# Patient Record
Sex: FEMALE | Race: BLACK OR AFRICAN AMERICAN | NOT HISPANIC OR LATINO | URBAN - METROPOLITAN AREA
[De-identification: names, ages, dates, MRNs, and addresses within clinical notes are randomized per-mention and may not be internally consistent; named-entity substitution may affect disease eponyms.]

---

## 2017-01-01 ENCOUNTER — AMBULATORY - HEALTHEAST (OUTPATIENT)
Dept: BEHAVIORAL HEALTH | Facility: CLINIC | Age: 52
End: 2017-01-01

## 2017-01-08 ENCOUNTER — COMMUNICATION - HEALTHEAST (OUTPATIENT)
Dept: BEHAVIORAL HEALTH | Facility: CLINIC | Age: 52
End: 2017-01-08

## 2017-02-02 ENCOUNTER — OFFICE VISIT - HEALTHEAST (OUTPATIENT)
Dept: ADDICTION MEDICINE | Facility: CLINIC | Age: 52
End: 2017-02-02

## 2017-02-02 DIAGNOSIS — F11.20 OPIOID USE DISORDER, SEVERE, DEPENDENCE (H): ICD-10-CM

## 2017-02-13 ENCOUNTER — COMMUNICATION - HEALTHEAST (OUTPATIENT)
Dept: BEHAVIORAL HEALTH | Facility: CLINIC | Age: 52
End: 2017-02-13

## 2017-03-03 ENCOUNTER — COMMUNICATION - HEALTHEAST (OUTPATIENT)
Dept: BEHAVIORAL HEALTH | Facility: CLINIC | Age: 52
End: 2017-03-03

## 2017-03-07 ENCOUNTER — COMMUNICATION - HEALTHEAST (OUTPATIENT)
Dept: BEHAVIORAL HEALTH | Facility: CLINIC | Age: 52
End: 2017-03-07

## 2017-03-07 DIAGNOSIS — E11.42 TYPE 2 DIABETES MELLITUS WITH DIABETIC POLYNEUROPATHY, WITH LONG-TERM CURRENT USE OF INSULIN (H): ICD-10-CM

## 2017-03-07 DIAGNOSIS — Z79.4 TYPE 2 DIABETES MELLITUS WITH DIABETIC POLYNEUROPATHY, WITH LONG-TERM CURRENT USE OF INSULIN (H): ICD-10-CM

## 2017-05-10 ENCOUNTER — AMBULATORY - HEALTHEAST (OUTPATIENT)
Dept: ADDICTION MEDICINE | Facility: CLINIC | Age: 52
End: 2017-05-10

## 2018-12-18 ENCOUNTER — OFFICE VISIT (OUTPATIENT)
Dept: FAMILY MEDICINE | Facility: CLINIC | Age: 53
End: 2018-12-18
Payer: MEDICAID

## 2018-12-18 ENCOUNTER — ANCILLARY PROCEDURE (OUTPATIENT)
Dept: GENERAL RADIOLOGY | Facility: CLINIC | Age: 53
End: 2018-12-18
Attending: NURSE PRACTITIONER
Payer: MEDICAID

## 2018-12-18 VITALS
DIASTOLIC BLOOD PRESSURE: 74 MMHG | HEART RATE: 60 BPM | BODY MASS INDEX: 28.44 KG/M2 | OXYGEN SATURATION: 100 % | SYSTOLIC BLOOD PRESSURE: 128 MMHG | WEIGHT: 192 LBS | TEMPERATURE: 98.2 F | RESPIRATION RATE: 28 BRPM | HEIGHT: 69 IN

## 2018-12-18 DIAGNOSIS — F19.20 DRUG ADDICTION (H): ICD-10-CM

## 2018-12-18 DIAGNOSIS — M25.561 RIGHT KNEE PAIN, UNSPECIFIED CHRONICITY: Primary | ICD-10-CM

## 2018-12-18 DIAGNOSIS — F14.90 CRACK COCAINE USE: ICD-10-CM

## 2018-12-18 DIAGNOSIS — M25.561 RIGHT KNEE PAIN, UNSPECIFIED CHRONICITY: ICD-10-CM

## 2018-12-18 DIAGNOSIS — Z12.31 VISIT FOR SCREENING MAMMOGRAM: ICD-10-CM

## 2018-12-18 PROCEDURE — 99214 OFFICE O/P EST MOD 30 MIN: CPT | Performed by: NURSE PRACTITIONER

## 2018-12-18 PROCEDURE — 73562 X-RAY EXAM OF KNEE 3: CPT | Mod: RT

## 2018-12-18 RX ORDER — INSULIN GLARGINE 100 [IU]/ML
INJECTION, SOLUTION SUBCUTANEOUS
COMMUNITY
Start: 2018-12-18

## 2018-12-18 SDOH — HEALTH STABILITY: MENTAL HEALTH: HOW OFTEN DO YOU HAVE A DRINK CONTAINING ALCOHOL?: NEVER

## 2018-12-18 ASSESSMENT — MIFFLIN-ST. JEOR: SCORE: 1540.29

## 2018-12-18 NOTE — PROGRESS NOTES
"  SUBJECTIVE:   Maliha Mcneil is a 53 year old female who presents to clinic today for the following health issues:      New Patient/Transfer of Care  Diabetes Follow-up  Patient is checking blood sugars: three times daily.   Blood sugar testing frequency justification: Uncontrolled diabetes  She sees Dr. Montoya at [facility].      Symptoms of hypoglycemia (low blood sugar): none     Paresthesias (numbness or burning in feet) or sores: Yes right foot.      Date of last diabetic eye exam: 6 months ago @ [facility]     BP Readings from Last 2 Encounters:   06/19/15 143/89   01/28/15 156/84     No results found for: A1C, LDL    Diabetes Management Resources    Amount of exercise or physical activity: 6-7 days/week for an average of 15-30 minutes    Problems taking medications regularly: No    Medication side effects: none    Diet: regular (no restrictions) and diabetic      Right knee pain.  History of \"arthritis.\"  It hurts every day.  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  No recent injury.      Psychiatry:  At [facility].  She sees a psychiatrist there for her abilify and lexapro.    Linopril:  For her diabetes.    Insulin:  Managed by Dr. Montoya at [facility].  Lantus: 55 units per day  Novolo units, 3 times a day    Crack cocaine abuse:  3-4 times a week.      Problem list and histories reviewed & adjusted, as indicated.  Additional history: as documented    Patient Active Problem List   Diagnosis     Major depressive disorder, recurrent episode, moderate (H)     Paranoid schizophrenia (H)     History reviewed. No pertinent surgical history.    Social History     Tobacco Use     Smoking status: Current Every Day Smoker     Packs/day: 0.50     Types: Cigarettes     Smokeless tobacco: Never Used     Tobacco comment: 6-7 per day    Substance Use Topics     Alcohol use: No     Frequency: Never     History reviewed. No pertinent family history.      Current Outpatient Medications   Medication " "Sig Dispense Refill     ARIPiprazole (ABILIFY) 9.75 MG/1.3ML injection Inject 400 mg into the muscle once       Escitalopram Oxalate (LEXAPRO PO) Take 20 mg by mouth daily       exenatide (BYETTA) 5 mcg/0.02mL per dose (60 doses per 1.2 mL prefilled pen) Inject 5 mcg Subcutaneous daily       GABAPENTIN PO Take 600 mg by mouth 2 times daily       insulin glargine (LANTUS VIAL) 100 UNIT/ML soln Inject 50 Units Subcutaneous every morning       Linaclotide (LINZESS PO) Take 145 mcg by mouth every morning (before breakfast)       LISINOPRIL PO Take 10 mg by mouth daily        Allergies   Allergen Reactions     Fruit Extracts Itching     No lab results found.   BP Readings from Last 3 Encounters:   12/18/18 128/74   06/19/15 143/89   01/28/15 156/84    Wt Readings from Last 3 Encounters:   12/18/18 87.1 kg (192 lb)            Labs reviewed in EPIC    Reviewed and updated as needed this visit by clinical staff       Reviewed and updated as needed this visit by Provider         ROS:  Constitutional, HEENT, cardiovascular, pulmonary, GI, , musculoskeletal, neuro, skin, endocrine and psych systems are negative, except as otherwise noted.    OBJECTIVE:     /74   Pulse 60   Temp 98.2  F (36.8  C)   Resp 28   Ht 1.753 m (5' 9\")   Wt 87.1 kg (192 lb)   SpO2 100%   Breastfeeding? No   BMI 28.35 kg/m    Body mass index is 28.35 kg/m .  Constitutional: healthy, alert and no distress  Neck: supple, no adenopathy  Cardiovascular: RRR. No murmurs, clicks gallops or rub  Respiratory: Respirations easy and regular. No respiratory distress noted. Lung sounds clear to auscultation.  MS: Bilateral knees without redness or swelling.  Right knee with No obvious deformity. Anterior and posterior drawer negative. + crepitus with ROM. Varus and valgus stress negative. Passive and active ROM intact. LE strong with resistence. Right DP and PT pulses 2+. CWMS intact distally with brisk capillary refill.    Neurologic: Gait normal. " Reflexes normal and symmetric. Sensation grossly WNL.  Psychiatric: mentation appears normal and affect normal/bright      Right knee xray:  KNEE THREE VIEWS RIGHT  12/18/2018 1:55 PM      HISTORY: Right knee pain, unspecified chronicity.     COMPARISON: None.     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 Moderate patellar spurring.  Question irregularity of the patellar joint surface. Moderate loss of  joint space in the medial compartment. No suprapatellar effusion.  There is no acute fracture. No dislocation. Sunrise views appear  aligned.  There are no worrisome bony lesions.                                                                      IMPRESSION:  No acute osseous abnormality demonstrated.     KATI VAZQUEZ MD    ASSESSMENT/PLAN:     (M25.561) Right knee pain, unspecified chronicity  (primary encounter diagnosis)  Comment:   Plan: XR Knee Right 3 Views, ORTHO  REFERRAL          I reassured the patient that her knee x-ray today is negative for fracture or acute changes but I do see probable arthritis.  I encouraged her to do gentle stretching and range of motion to follow-up with orthopedics at her earliest convenience for a consultation and treatment options.      She will continue her medical care at [facility].        (F19.20) Drug addiction (H)  Comment:   Plan: I encouraged the patient to totally abstain from drugs  and crack cocaine.  She states she is doing the best she can.   I did offer her help with abstaining from drugs, helping her find a therapist, helping her find a treatment program all of which she declined.    (F14.90) Crack cocaine use  Comment:   Plan: As above    (Z12.31) Visit for screening mammogram  Comment: Due  Plan: MA SCREENING DIGITAL BILAT - Future  (s+30)        She states she will go forward at the screening mammogram.    I did spend a total of 30 minutes with this patient talking about her medical history, her knee pain, crack cocaine use, developing a plan of care  and coordinating follow-up care. She was appreciative.        SANDEE Maloney [facility]

## 2018-12-19 ENCOUNTER — PATIENT OUTREACH (OUTPATIENT)
Dept: CARE COORDINATION | Facility: CLINIC | Age: 53
End: 2018-12-19

## 2018-12-19 NOTE — TELEPHONE ENCOUNTER
FUTURE VISIT INFORMATION      FUTURE VISIT INFORMATION:    Date: 12/20/18    Time:     [place] [facility] [place]  REFERRAL INFORMATION:    Referring provider:  self    Referring providers clinic:      Reason for visit/diagnosis  Right knee pain, unspecified chronicity, recs in epic, ref attached, xrays 12/18, scheduled per pt    RECORDS REQUESTED FROM:       Clinic name Comments Records Status Imaging Status     internal internal

## 2018-12-20 ENCOUNTER — PRE VISIT (OUTPATIENT)
Dept: ORTHOPEDICS | Facility: CLINIC | Age: 53
End: 2018-12-20

## 2018-12-20 NOTE — TELEPHONE ENCOUNTER
FUTURE VISIT INFORMATION      FUTURE VISIT INFORMATION:    Date: 12/26/18    Time:     [place] [facility] [place]  REFERRAL INFORMATION:    Referring provider:  Nani Madden    Referring providers clinic:      Reason for visit/diagnosis  Right knee pain, unspecified chronicity, recs in epic, ref attached, xrays 12/18, scheduled per pt    RECORDS REQUESTED FROM:       Clinic name Comments Records Status Imaging Status     internal internal

## 2018-12-26 ENCOUNTER — OFFICE VISIT (OUTPATIENT)
Dept: ORTHOPEDICS | Facility: CLINIC | Age: 53
End: 2018-12-26
Payer: MEDICAID

## 2018-12-26 ENCOUNTER — PRE VISIT (OUTPATIENT)
Dept: ORTHOPEDICS | Facility: CLINIC | Age: 53
End: 2018-12-26

## 2018-12-26 ENCOUNTER — TELEPHONE (OUTPATIENT)
Dept: ORTHOPEDICS | Facility: CLINIC | Age: 53
End: 2018-12-26

## 2018-12-26 VITALS — BODY MASS INDEX: 28.44 KG/M2 | HEIGHT: 69 IN | WEIGHT: 192 LBS

## 2018-12-26 DIAGNOSIS — M17.11 PRIMARY OSTEOARTHRITIS OF RIGHT KNEE: Primary | ICD-10-CM

## 2018-12-26 ASSESSMENT — MIFFLIN-ST. JEOR: SCORE: 1540.29

## 2018-12-26 NOTE — TELEPHONE ENCOUNTER
Received message from PA that synvisc one is covered by her insurance so she will see Dr. Joe for injection 1/2/19, patient agreeable.

## 2018-12-26 NOTE — TELEPHONE ENCOUNTER
----- Message from Darcie Santillan sent at 12/26/2018  2:58 PM CST -----  Regarding: RE: PA for synvisc  She is approved.    Thank you,  Darcie  ----- Message -----  From: Joie Cedeno LPN  Sent: 12/26/2018   1:32 PM  To: [facility] [employer] [facility]  Subject: PA for synvisc                                   Can someone do a PA for synvisc ordered by Dr. Joe?    Thanks,     Joie

## 2018-12-26 NOTE — PROGRESS NOTES
" Subjective:   Maliha Mcneil is a 53 year old female who is here for right knee pain x 1 year.   Right knee pain, located medially.  Denies any falls.  6 months ago was last knee injection.  Pt reports that didn't help much.  Pt has DM, reports she's controlled, taking insulin.  Doesn't like Tylenol.  No icing or elevation.  Never been to PT.    Background:   Date of injury: none     Aggravating factors: constant  Relieving Factors: NSAIDs  Prior Evaluation: None    PAST MEDICAL, SOCIAL, SURGICAL AND FAMILY HISTORY: She  has a past medical history of Major depressive disorder, recurrent episode, moderate (H) (9/17/2014) and Paranoid schizophrenia, unspecified condition (9/17/2014).  She  has no past surgical history on file.  Her family history is not on file.  She reports that she has been smoking cigarettes.  She has been smoking about 0.50 packs per day. she has never used smokeless tobacco. She reports that she uses drugs. Drug: \"Crack\" cocaine. Frequency: 4.00 times per week. She reports that she does not drink alcohol.    ALLERGIES: She is allergic to fruit extracts.    CURRENT MEDICATIONS: She has a current medication list which includes the following prescription(s): aripiprazole, escitalopram oxalate, insulin glargine, and lisinopril.     REVIEW OF SYSTEMS: 10 point review of systems is negative except as noted above.     Exam:   Ht 1.753 m (5' 9\")   Wt 87.1 kg (192 lb)   BMI 28.35 kg/m             CONSTITUTIONAL: alert, mild distress and cooperative  HEAD: Normocephalic. No masses, lesions, tenderness or abnormalities  SKIN: no suspicious lesions or rashes  GAIT: antalgic  NEUROLOGIC: Non-focal  PSYCHIATRIC: affect normal/bright and mentation appears normal.    MUSCULOSKELETAL: right knee pain      Inspection:       AP/lateral alignment normal  Tender: medial joint line      Non-tender: patellar facets, MCL, LCL, lateral joint line, IT band, posterior knee   Active Range of Motion: all normal  Strength: " quad  5-/5, Hamstrings  5-/5  Special tests: normal Valgus stress test, normal Varus, negative Lachman's test, negative Aftab's, no apprehension with lateral stress of the patella.         Assessment/Plan:   Pt is a 52 yo AA female with PMHx of polysubstance abuse, paranoid schizophrenia, MDD presenting with right knee pain  1. 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 Moderate patellar spurring.  Question irregularity of the patellar joint surface. Moderate loss of  joint space in the medial compartment. No suprapatellar effusion.  There is no acute fracture. No dislocation. Sunrise views appear  aligned.  There are no worrisome bony lesions.

## 2018-12-26 NOTE — LETTER
"  12/26/2018      RE: Maliha Mcneil  [address] [place] [address]        Subjective:   Maliha Mcneil is a 53 year old female who is here for right knee pain x 1 year.   Right knee pain, located medially.  Denies any falls.  6 months ago was last knee injection.  Pt reports that didn't help much.  Pt has DM, reports she's controlled, taking insulin.  Doesn't like Tylenol.  No icing or elevation.  Never been to PT.    Background:   Date of injury: none     Aggravating factors: constant  Relieving Factors: NSAIDs  Prior Evaluation: None    PAST MEDICAL, SOCIAL, SURGICAL AND FAMILY HISTORY: She  has a past medical history of Major depressive disorder, recurrent episode, moderate (H) (9/17/2014) and Paranoid schizophrenia, unspecified condition (9/17/2014).  She  has no past surgical history on file.  Her family history is not on file.  She reports that she has been smoking cigarettes.  She has been smoking about 0.50 packs per day. she has never used smokeless tobacco. She reports that she uses drugs. Drug: \"Crack\" cocaine. Frequency: 4.00 times per week. She reports that she does not drink alcohol.    ALLERGIES: She is allergic to fruit extracts.    CURRENT MEDICATIONS: She has a current medication list which includes the following prescription(s): aripiprazole, escitalopram oxalate, insulin glargine, and lisinopril.     REVIEW OF SYSTEMS: 10 point review of systems is negative except as noted above.     Exam:   Ht 1.753 m (5' 9\")   Wt 87.1 kg (192 lb)   BMI 28.35 kg/m              CONSTITUTIONAL: alert, mild distress and cooperative  HEAD: Normocephalic. No masses, lesions, tenderness or abnormalities  SKIN: no suspicious lesions or rashes  GAIT: antalgic  NEUROLOGIC: Non-focal  PSYCHIATRIC: affect normal/bright and mentation appears normal.    MUSCULOSKELETAL: right knee pain      Inspection:       AP/lateral alignment normal  Tender: medial joint line      Non-tender: patellar facets, MCL, LCL, lateral " joint line, IT band, posterior knee   Active Range of Motion: all normal  Strength: quad  5-/5, Hamstrings  5-/5  Special tests: normal Valgus stress test, normal Varus, negative Lachman's test, negative Aftab's, no apprehension with lateral stress of the patella.         Assessment/Plan:   Pt is a 52 yo AA female with PMHx of polysubstance abuse, paranoid schizophrenia, MDD presenting with right knee pain  1. 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 Moderate patellar spurring.  Question irregularity of the patellar joint surface. Moderate loss of  joint space in the medial compartment. No suprapatellar effusion.  There is no acute fracture. No dislocation. Sunrise views appear  aligned.  There are no worrisome bony lesions.    Lilo Joe MD

## 2019-01-16 ENCOUNTER — OFFICE VISIT (OUTPATIENT)
Dept: ORTHOPEDICS | Facility: CLINIC | Age: 54
End: 2019-01-16
Payer: MEDICAID

## 2019-01-16 VITALS — RESPIRATION RATE: 16 BRPM | WEIGHT: 192 LBS | HEIGHT: 69 IN | BODY MASS INDEX: 28.44 KG/M2

## 2019-01-16 DIAGNOSIS — M17.11 PRIMARY OSTEOARTHRITIS OF RIGHT KNEE: Primary | ICD-10-CM

## 2019-01-16 RX ORDER — IBUPROFEN 600 MG/1
600 TABLET, FILM COATED ORAL EVERY 6 HOURS PRN
Qty: 20 TABLET | Refills: 0 | Status: SHIPPED | OUTPATIENT
Start: 2019-01-16 | End: 2020-01-16

## 2019-01-16 ASSESSMENT — MIFFLIN-ST. JEOR: SCORE: 1540.29

## 2019-01-16 NOTE — LETTER
"  1/16/2019      RE: Maliha Mcneil  [address] [place] [address]        Subjective:   Maliha Mcneil is a 53 year old female who is here requesting a Synvisc 1 knee injection for her right knee osteoarthritis.  No falls.  Other side now acting up.  Might be compensating.    Date of injury: NA  Date last seen: 12/26/2018  Following Therapeutic Plan: Yes   Pain: Unchanged  Function: Unchanged  Interval History:  Right knee pain continues    PAST MEDICAL, SOCIAL, SURGICAL AND FAMILY HISTORY: She  has a past medical history of Major depressive disorder, recurrent episode, moderate (H) (9/17/2014) and Paranoid schizophrenia, unspecified condition (9/17/2014).  She  has no past surgical history on file.  Her family history is not on file.  She reports that she has been smoking cigarettes.  She has been smoking about 0.50 packs per day. she has never used smokeless tobacco. She reports that she uses drugs. Drug: \"Crack\" cocaine. Frequency: 4.00 times per week. She reports that she does not drink alcohol.    ALLERGIES: She is allergic to fruit extracts.    CURRENT MEDICATIONS: She has a current medication list which includes the following prescription(s): aripiprazole, escitalopram oxalate, insulin glargine, lisinopril, and order for dme.     REVIEW OF SYSTEMS: 10 point review of systems is negative except as noted above.     Exam:   Resp 16   Ht 1.753 m (5' 9\")   Wt 87.1 kg (192 lb)   BMI 28.35 kg/m              CONSTITUTIONAL: healthy, alert, no distress and cooperative  HEAD: Normocephalic. No masses, lesions, tenderness or abnormalities  SKIN: no suspicious lesions or rashes  GAIT: antalgic  NEUROLOGIC: Non-focal  PSYCHIATRIC: affect normal/bright and mentation appears normal.    MUSCULOSKELETAL: right medial knee pain      Inspection:       AP/lateral alignment normal  Tender: medial knee pain      Non-tender: patellar facets, MCL, LCL, lateral joint line,  IT band, posterior knee   Active Range of " Motion: all normal  Strength: quad  5-/5, Hamstrings  5-/5, Gastroc  5/5, Tibialis anterior  5/5  Special tests: normal Valgus stress test, normal Varus, negative Lachman's test, negative Aftab's, no apprehension with lateral stress of the patella.         Assessment/Plan:   Pt is a 52 yo AA female with PMHx of depression, paranoid schizophrenic presenting with right knee OA  1. Right knee OA- synvisc injection  Pt to ice if needed  Strengthening with exercises    RTC 6 months      Large Joint Injection/Arthocentesis: R knee joint  Date/Time: 1/16/2019 9:29 AM  Performed by: Lilo Joe MD  Authorized by: Lilo Joe MD     Indications:  Osteoarthritis  Needle Size:  22 G  Guidance: landmark guided    Approach:  Anterolateral  Location:  Knee  Site:  R knee joint  Medications:  48 mg hylan 48 MG/6ML  Procedure discussed: discussed risks, benefits, and alternatives    Consent Given by:  Patient  Timeout: timeout called immediately prior to procedure    Prep: patient was prepped and draped in usual sterile fashion     No drug waste          X-RAY INTERPRETATION:   reviewed    Lilo Joe MD

## 2019-01-16 NOTE — PROGRESS NOTES
" Subjective:   Maliha Mcneil is a 53 year old female who is here requesting a Synvisc 1 knee injection for her right knee osteoarthritis.  No falls.  Other side now acting up.  Might be compensating.    Date of injury: NA  Date last seen: 12/26/2018  Following Therapeutic Plan: Yes   Pain: Unchanged  Function: Unchanged  Interval History:  Right knee pain continues    PAST MEDICAL, SOCIAL, SURGICAL AND FAMILY HISTORY: She  has a past medical history of Major depressive disorder, recurrent episode, moderate (H) (9/17/2014) and Paranoid schizophrenia, unspecified condition (9/17/2014).  She  has no past surgical history on file.  Her family history is not on file.  She reports that she has been smoking cigarettes.  She has been smoking about 0.50 packs per day. she has never used smokeless tobacco. She reports that she uses drugs. Drug: \"Crack\" cocaine. Frequency: 4.00 times per week. She reports that she does not drink alcohol.    ALLERGIES: She is allergic to fruit extracts.    CURRENT MEDICATIONS: She has a current medication list which includes the following prescription(s): aripiprazole, escitalopram oxalate, insulin glargine, lisinopril, and order for dme.     REVIEW OF SYSTEMS: 10 point review of systems is negative except as noted above.     Exam:   Resp 16   Ht 1.753 m (5' 9\")   Wt 87.1 kg (192 lb)   BMI 28.35 kg/m             CONSTITUTIONAL: healthy, alert, no distress and cooperative  HEAD: Normocephalic. No masses, lesions, tenderness or abnormalities  SKIN: no suspicious lesions or rashes  GAIT: antalgic  NEUROLOGIC: Non-focal  PSYCHIATRIC: affect normal/bright and mentation appears normal.    MUSCULOSKELETAL: right medial knee pain      Inspection:       AP/lateral alignment normal  Tender: medial knee pain      Non-tender: patellar facets, MCL, LCL, lateral joint line,  IT band, posterior knee   Active Range of Motion: all normal  Strength: quad  5-/5, Hamstrings  5-/5, Gastroc  5/5, Tibialis " anterior  5/5  Special tests: normal Valgus stress test, normal Varus, negative Lachman's test, negative Aftab's, no apprehension with lateral stress of the patella.         Assessment/Plan:   Pt is a 54 yo AA female with PMHx of depression, paranoid schizophrenic presenting with right knee OA  1. Right knee OA- synvisc injection  Pt to ice if needed  Strengthening with exercises    RTC 6 months      Large Joint Injection/Arthocentesis: R knee joint  Date/Time: 1/16/2019 9:29 AM  Performed by: Lilo Joe MD  Authorized by: Lilo Joe MD     Indications:  Osteoarthritis  Needle Size:  22 G  Guidance: landmark guided    Approach:  Anterolateral  Location:  Knee  Site:  R knee joint  Medications:  48 mg hylan 48 MG/6ML  Procedure discussed: discussed risks, benefits, and alternatives    Consent Given by:  Patient  Timeout: timeout called immediately prior to procedure    Prep: patient was prepped and draped in usual sterile fashion     No drug waste          X-RAY INTERPRETATION:   reviewed

## 2021-06-08 NOTE — PROGRESS NOTES
Intake Note:   D) Maliha Mcneil is a 51 y.o.   Black or   female who is referred to MICD via IP with funding from [facility]/medicare. Patient orientated x 3. Patient meets criteria for   Patient Active Problem List   Diagnosis     Severe opioid use disorder     Depressive disorder     Post traumatic stress disorder (PTSD)     Major depressive disorder, recurrent, severe with psychotic features     Hepatitis C     Crack cocaine use     Type 2 diabetes mellitus     Cognitive deficits     Polysubstance (including opioids) dependence, daily use     Undifferentiated schizophrenia     Drug-induced constipation     Patient appears appropriate for MICD  A)Completed intake assessment; preliminary paperwork; counselor & supervisor license number and contact info., Patient Bill of Rights, , program rules/regulations, , Abuse Prevention Plan,, confidentiality & HIPPA policies, , grievance procedure,, presented ROIs, , TB & HIV/AIDS policies & resources, and Vulnerable Adult policy, .   Conducted Vulnerable Adult Assessment yes .   R)No special Vulnerable Adult needed at this time. .   Patient signed and agreed to counselor & supervisor license number and contact info., Patient Bill of Rights, , group rules/regulations, , Abuse Prevention Plan,, confidentiality & HIPPA policies, , grievance procedure,, presented ROIs, TB & HIV/AIDS policies & resources, and Vulnerable Adult policy. Patient scored High on PANSI screen. Denies any si at the time of intake  Dimension I . Withdrawal Potential - Risk level - 0- No Concern. Client reports using crack 30/30 days prior to inpatient. She denies using Heroin or other opiates. She is on 80mg of Methadone through [facility]. Client was continued on 80 mg of Methadone. Client is dosing at  [facility]  1/27/17. Client reports use of crack cocaine 2/1/17 20 bag    Dimension II :2. Biomedical Conditions and Complications - Risk level - 2- Moderate Concern. Client reports  history of diabetes. She also states that she smokes a half pack of cigarettes per day.  Client was followed medically for her diabetes. Blood sugars improved. Client was provided with nicotine replacement therapies whileIp. Client is set up with [facility] for home health nurse    Dimension III 3. Emotional/Behavioral/Cognitive Conditions and Complications - Risk level - 2- Moderate Concern. Client reports history of depression, anxiety, schizophrenia, paranoia, PTSD. Client was followed psychiatrically while inpatient. She has IM Abilify. She is set up in our OP MH Clinic 3/12/17 at [facility] with Jazmin Raygoza NP. Client will continue to work on mental health issues in [facility] OP treatment here. Client is set up with an ARMS worker through her housing, [facility].     Dimension 4. Treatment Acceptance/Resistance - Risk Level - 0- No Concern. Client states that she is here voluntarily and is willing to do treatment.     Dimension5. Relapse/Continued Use/Continued Problem Potential - Risk level - 4- Extreme Concern. Client has had 5 prior treatments, most recently 7/2016. Client currently lacks relapse prevention strategies. Client states that she has attended a couple of support groups in the past 30 days. Client verbalized her consequences due to her use, relapse triggers and stressors and other relapse prevention assignments. Risk level 3 - Serious concern. Client will continue to work on relapse issues in [facility] OP treatment     Dimension VI 6. Recovery Environment - Risk level - 3- Serious Concern. Client resides at [facility]- has a CM. Client reports limited sober activities. Has attended a few abstinent support groups in the past 30 days. [facility] CM was contacted. Conference with client's CM, Paul Forte through [facility]. No sober peers, no daily activities,     T) Explained counselor & supervisor license number and contact info., Patient Bill of Rights, , program rules/regulations, , Abuse Prevention  Plan,, confidentiality & HIPPA policies, , grievance procedure,, presented ROIs, , TB & HIV/AIDS policies & resources, and Vulnerable Adult policy.

## 2021-06-08 NOTE — PROGRESS NOTES
Addiction Services - Initial Services Plan      Name:  Maliha Mcneil       :  1965        MRN:  [id number]    Goal Methods   1.  Acceptance of chemical dependency and mental illness as a disease. A.  Comply with all med/psych recommendations  B.  Complete preliminary interviews  C.  Attend all program functions  D.  Attend all individual counseling sessions  E.  Read all assigned literature  F.  Complete psychological testing as assigned  G.  Particpate in any necessary consultations     2.  Acceptance of my need and ability to change A.  Complete written workbook assignments on MICD  B.  Participate in conferences (P.O., , family)  C.  Participate in 12 Step/support groups  D.  Actively participate in treatment planning and reviews  E.  Complete all assignments given or recommended on Treatment Plan  F.  Present Drug History/Peer Assessment with peer group  G.  Complete 10th Step Inventory daily   3.  Acceptance of staff recommendations as a means to my recovery A.  Participate in all interviews for Continuum of Care Plans  B.  Familiarize self with 12 Step Recovery Program and how this applies to your day-to-day behavior  C.  Demonstrate a working knowledge of AA steps of recovery  D.  Obtain a sponsor     Patient describes their immediate need:  intake  Are there any immediate Safety Needs such as (physical, stability, mobility):  none    Immediate Health Needs and Plan:    Being cared for  Vulnerable Adult:  No    [x] Continue Current Medications for:   [] Request Consult for:  [] Notify Attending Physician about:  [] Other:      Issues to be addressed i the first sessions:    Treatment planning    Patient strengths and needs:  Therapy,     Plan for patient for time between intake and completion of the treatment plan:  Attend all groups and medical appointments    Staff Members' Titles authorized to Initiate Services are:    Director of Behavioral Service    Clinical Director of Chemical  Dependency    Primary Counselor    MI/HAYLEY Sr. Counselor        Nursing Staff    Vulnerable Adult Review  [] Review of the facility Abuse Prevention plan was reviewed with the patient  [] No individual abuse plan is necessary  [] In addition to the facility Abuse Prevention plan, an Individual Abuse Plan will be put in place    I understand these goals to be the Treatment Goals of the Program, and I agree to the stated Methods in attempting to accomplish these goals.    Patient Signature:  _________________________Date:  2/2/2017      Staff Name/Title: BELINDA Gonzalez-T      Date:  2/2/2017  Time: 1:41 PM

## 2021-06-10 NOTE — PROGRESS NOTES
Discharge Note    Maliha Mcneil is a 51 y.o.    Black or  female who completed an intake on 2/2/2017.  Client never presented to group and there has been no contact with client since.    Counselor Name and Title:  ABBI Middleton        Date:  5/10/2017  Time:  9:13 AM

## 2021-07-03 NOTE — ADDENDUM NOTE
Addendum Note by Damian Craven MD at 2/16/2017 10:30 AM     Author: Damian Craven MD Service: -- Author Type: Physician    Filed: 2/16/2017 10:30 AM Encounter Date: 2/2/2017 Status: Signed    : Damian Craven MD (Physician)    Addended by: DAMIAN CRAVEN on: 2/16/2017 10:30 AM        Modules accepted: Orders
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
